# Patient Record
Sex: MALE | Race: WHITE | Employment: FULL TIME | URBAN - METROPOLITAN AREA
[De-identification: names, ages, dates, MRNs, and addresses within clinical notes are randomized per-mention and may not be internally consistent; named-entity substitution may affect disease eponyms.]

---

## 2018-12-09 ENCOUNTER — HOSPITAL ENCOUNTER (OUTPATIENT)
Age: 53
Discharge: HOME | End: 2018-12-09
Payer: COMMERCIAL

## 2018-12-09 VITALS
SYSTOLIC BLOOD PRESSURE: 139 MMHG | OXYGEN SATURATION: 97 % | BODY MASS INDEX: 34.36 KG/M2 | WEIGHT: 240 LBS | HEART RATE: 85 BPM | RESPIRATION RATE: 18 BRPM | HEIGHT: 70 IN | TEMPERATURE: 98 F | DIASTOLIC BLOOD PRESSURE: 91 MMHG

## 2018-12-09 DIAGNOSIS — S01.01XA LACERATION OF SCALP, INITIAL ENCOUNTER: ICD-10-CM

## 2018-12-09 DIAGNOSIS — S09.90XA INJURY OF HEAD, INITIAL ENCOUNTER: Primary | ICD-10-CM

## 2018-12-09 PROCEDURE — 12002 RPR S/N/AX/GEN/TRNK2.6-7.5CM: CPT

## 2018-12-09 PROCEDURE — 99203 OFFICE O/P NEW LOW 30 MIN: CPT

## 2018-12-09 RX ORDER — OMEPRAZOLE 40 MG/1
CAPSULE, DELAYED RELEASE ORAL
Refills: 3 | COMMUNITY
Start: 2018-09-21

## 2018-12-09 RX ORDER — SITAGLIPTIN 100 MG/1
TABLET, FILM COATED ORAL
Refills: 1 | COMMUNITY
Start: 2018-11-16

## 2018-12-09 RX ORDER — ATORVASTATIN CALCIUM 80 MG/1
TABLET, FILM COATED ORAL
Refills: 0 | COMMUNITY
Start: 2018-09-06

## 2018-12-09 RX ORDER — LOSARTAN POTASSIUM 50 MG/1
TABLET ORAL
Refills: 0 | COMMUNITY
Start: 2018-11-16

## 2018-12-09 NOTE — ED INITIAL ASSESSMENT (HPI)
Patient with 4.5 cm long laceration to top of scalp, patient states he was in his attic this am and scraped the top of his head on a eamon nail. Per emr last tdap 2016.

## 2018-12-09 NOTE — ED PROVIDER NOTES
Patient presents with:  Laceration Abrasion (integumentary)      HPI:     Marva Nose is a 46year old male presents for a chief complaint of laceration evaluation and repair.   Patient had the top of his head in his crawl space this morning while getting d negative except as noted above. Constitutional and Vital Signs Reviewed. Physical Exam:     Findings:   Length / Size (in cm): Or centimeter laceration to the top of the scalp. No active bleeding.    Wound free of debris / FB:  Yes   Normal capillary They are also aware the staples should be removed in 10 days. They will follow-up closely with his primary care doctor in aware of signs and symptoms of infection. Diagnosis:    ICD-10-CM    1. Injury of head, initial encounter S09.90XA    2.  [place]

## 2018-12-19 ENCOUNTER — HOSPITAL ENCOUNTER (OUTPATIENT)
Age: 53
Discharge: HOME | End: 2018-12-19
Attending: EMERGENCY MEDICINE
Payer: COMMERCIAL

## 2018-12-19 VITALS
WEIGHT: 240 LBS | RESPIRATION RATE: 20 BRPM | DIASTOLIC BLOOD PRESSURE: 86 MMHG | HEIGHT: 71 IN | TEMPERATURE: 98 F | OXYGEN SATURATION: 93 % | HEART RATE: 86 BPM | BODY MASS INDEX: 33.6 KG/M2 | SYSTOLIC BLOOD PRESSURE: 136 MMHG

## 2018-12-19 DIAGNOSIS — Z48.02 ENCOUNTER FOR REMOVAL OF SUTURES: Primary | ICD-10-CM

## 2018-12-19 NOTE — ED PROVIDER NOTES
Patient Seen in: 5 [facility]    History   [facility]:  [facility]    Stated Complaint: remove steaples    HPI    Patient is a 16-year-old male with past history of scalp laceration who nondistended  Neurologic: Patient is awake, alert and oriented ×3.   The patient's motor strength is 5 out of 5 and symmetric in the upper and lower extremities bilaterally  Extremities: No focal swelling or tenderness  Skin: No pallor, no redness or warmth